# Patient Record
Sex: MALE | Race: WHITE | NOT HISPANIC OR LATINO | Employment: FULL TIME | URBAN - METROPOLITAN AREA
[De-identification: names, ages, dates, MRNs, and addresses within clinical notes are randomized per-mention and may not be internally consistent; named-entity substitution may affect disease eponyms.]

---

## 2017-08-23 ENCOUNTER — HOSPITAL ENCOUNTER (OUTPATIENT)
Dept: PAIN MANAGEMENT | Facility: REHABILITATION | Age: 37
End: 2017-08-23
Attending: PHYSICAL MEDICINE & REHABILITATION
Payer: COMMERCIAL

## 2017-08-23 ENCOUNTER — HOSPITAL ENCOUNTER (OUTPATIENT)
Dept: RADIOLOGY | Facility: REHABILITATION | Age: 37
End: 2017-08-23
Attending: PHYSICAL MEDICINE & REHABILITATION
Payer: COMMERCIAL

## 2017-08-23 VITALS
HEIGHT: 74 IN | RESPIRATION RATE: 16 BRPM | BODY MASS INDEX: 27.87 KG/M2 | DIASTOLIC BLOOD PRESSURE: 82 MMHG | WEIGHT: 217.15 LBS | TEMPERATURE: 97.7 F | OXYGEN SATURATION: 96 % | HEART RATE: 66 BPM | SYSTOLIC BLOOD PRESSURE: 132 MMHG

## 2017-08-23 PROCEDURE — 99152 MOD SED SAME PHYS/QHP 5/>YRS: CPT

## 2017-08-23 PROCEDURE — 700111 HCHG RX REV CODE 636 W/ 250 OVERRIDE (IP)

## 2017-08-23 PROCEDURE — 64635 DESTROY LUMB/SAC FACET JNT: CPT

## 2017-08-23 PROCEDURE — 64636 DESTROY L/S FACET JNT ADDL: CPT

## 2017-08-23 RX ORDER — LIDOCAINE HYDROCHLORIDE 10 MG/ML
INJECTION, SOLUTION EPIDURAL; INFILTRATION; INTRACAUDAL; PERINEURAL
Status: COMPLETED
Start: 2017-08-23 | End: 2017-08-23

## 2017-08-23 RX ORDER — MIDAZOLAM HYDROCHLORIDE 1 MG/ML
INJECTION INTRAMUSCULAR; INTRAVENOUS
Status: COMPLETED
Start: 2017-08-23 | End: 2017-08-23

## 2017-08-23 RX ORDER — BETAMETHASONE SODIUM PHOSPHATE AND BETAMETHASONE ACETATE 3; 3 MG/ML; MG/ML
INJECTION, SUSPENSION INTRA-ARTICULAR; INTRALESIONAL; INTRAMUSCULAR; SOFT TISSUE
Status: COMPLETED
Start: 2017-08-23 | End: 2017-08-23

## 2017-08-23 RX ADMIN — FENTANYL CITRATE 25 MCG: 50 INJECTION, SOLUTION INTRAMUSCULAR; INTRAVENOUS at 13:43

## 2017-08-23 RX ADMIN — FENTANYL CITRATE 50 MCG: 50 INJECTION, SOLUTION INTRAMUSCULAR; INTRAVENOUS at 13:40

## 2017-08-23 RX ADMIN — LIDOCAINE HYDROCHLORIDE 10 ML: 10 INJECTION, SOLUTION EPIDURAL; INFILTRATION; INTRACAUDAL; PERINEURAL at 13:41

## 2017-08-23 RX ADMIN — MIDAZOLAM HYDROCHLORIDE 1 MG: 1 INJECTION, SOLUTION INTRAMUSCULAR; INTRAVENOUS at 13:39

## 2017-08-23 RX ADMIN — BETAMETHASONE SODIUM PHOSPHATE AND BETAMETHASONE ACETATE 12 MG: 3; 3 INJECTION, SUSPENSION INTRA-ARTICULAR; INTRALESIONAL; INTRAMUSCULAR at 13:48

## 2017-08-23 ASSESSMENT — PAIN SCALES - GENERAL
PAINLEVEL_OUTOF10: 1
PAINLEVEL_OUTOF10: 5

## 2017-08-23 NOTE — H&P
[facility] Progress Note    DATE OF SERVICE: 8/23/2017    Referring MD:    Reason for Service: Low back pain    History of Present Illness:  36 yo M with no sig PMHx presents with low back pain.    Pain Onset: 2013, slowly progressive worsening    PREVIOUS PROCEDURES: Dr Diallo Lumbar surgery Oct 2016 with minimal relief. Prolotherapy, RFN, Epidurals without long-term relief.    PREVIOUS THERAPIES: denies    PAIN LOCATION:  Lumbar/Sacral: Midline, Off-midline: RIGHT >> LEFT    PAIN DURATION: Constant  PAIN QUALITY: Sharp Dull Achy  PAIN INTENSITY:  7 out of 10 without medications  PAIN RADIATION: denies  NUMBNESS/TINGLING: denies  WEAKNESS: denies  MUSCLE SPASMS: denies    AGGRAVATING FACTORS: ROM, Weight bearing, All Physical Activities  RELIEVING FACTORS: Medications    Race: Declined to Specify  Ethnicity: Unknown  Preferred Language:English    Current Allergies:  No allergies on file    Current Medications:  doxepin - 10 mg  oxycodone-acetaminophen - 7.5-325 mg  Lyrica - 150 mg TID  Percocet -  mg Every 4-6 hours as needed    Family History  NATURAL PARENTS: Denies history of RA, MS, SLE    Social History: (+)cigarettes 1pk/day. Denies ETOH, illicits.  Smoking Status  EDUCATION GIVEN ABOUT:   Smoking Cessation   Smoking Effects    ROS: Denies visual or hearing changes, swallowing difficulty, SOB, CP, abd pain/constipation/diarrhea, dysuria. All other ROS have been reviewed, and are negative.    Physical Exam:  GENERAL APPEARANCE:  Body habitus: Normal BMI  Cooperation: Full cooperation with no signs of cognitive impairment  Appearance: Well-groomed, Non-disheveled.  SKIN: Normal - No skin rash, subcutaneous nodules, lesions or ulcers observed.  Skin is warm and dry with normal turgor.  H.E.E.N.T.: Normal - Normocephalic. Atraumatic. Pupil diameter equal bilaterally. No obvious auditory deficits. No thyromegaly. No lymphadenopathy.  RESPIRATORY: Normal - Normal symmetric breathing. No  use of accessory muscles. No signs of clubbing in finger nails.  CARDIOVASCULAR: Normal - No edema in Right or Left LE or UE. No trophic changes or stasis dermatitis noted. DP and PT pulses 2+ bilaterally  ABDOMEN: Normal - No obvious masses. No abdominal scars. No hepatosplenomegaly. No obvious hernias noted. No tenderness to palpation in all four quadrants.    Muscoloskeletal:  GAIT: Normal narrow based gait and normal step length, without use of ambulatory device    THORACIC/LUMBAR/SACRAL SPINE/HIPS:  No signs of scoliosis or kyphosis  No evidence of leg-length discrepancy and pelvis is symmetric bilaterally  No evidence of atrophy in BLE throughout  Full AROM with flexion, extension, lateral flexion, rotation bilaterally  + pain with flexion, extension, lateral flexion, rotation bilaterally  Full PROM in bilateral hips with flexion, abduction, extension, adduction  No tenderness in hips bilaterally with passive flexion, abduction, extension, adduction  + tenderness to palpation in midline at L3-S1 levels, off midline LEFT << RIGHT  No tenderness to palpation over SI joint on RIGHT LEFT  No tenderness to palpation over buttock on RIGHT LEFT  No tenderness to palpation in hip or over GTBs on RIGHT LEFT  Straight leg test negative bilaterally  ANAND test negative bilaterally  Motor - HF KF KE PF DF EHL  RIGHT 5/5----------------------->  LEFT   5/5----------------------->  Sensation in BLE intact to light touch  Reflexes - Patella L3/4 Hamstrings L5 Achilles S1 Plantar S2  RIGHT        2+                  2+                   2+           2+  LEFT          2+                   2+                  2+            2+  No ankle clonus bilaterally  Negative Babinski signs bilaterally    NEURO:  MMSE - AAOx3, is cooperative, able to follow commands consistently, able to answer all questions correctly and without hesitation, is able to concentrate fully with each activity asked, no mood or behavioral abnormalities  noted    Cranial Nerves - CNs 3-12 evaluated with no obvious deficits    Tone  No flaccidity or spasticity noted in BUE/BLE  No weakness in truncal stability, and patient able to sit stand without more than 10% of assistance  No contractures or rigidity noted at any joints    Lab Results:  No lab results were found.    Assessment/Plan:  Diagnosis:  Lumbar postlaminectomy syndrome  Lumbosacral spondylosis, Blake L3-S1  Chronic low back pain    Labs and Imagin17 Lspine XRs reviewed in [facility]    17 pending Lspine MRI at [facility]. He will bring CD with him next visit.    Procedures:  17 Order Rt L3-4-5 MB RFN with IV sedation.    17 Rt L3-4-5 MBB #2 with % relief for the duration of the anesthetic. Consider RFN if good results. Consider Lt side after.    17  Rt L3-4-5 MBB #1 with 100% relief for the duration of the anesthetic.  Consider SCS trial with Nuvectra vs Oral Scientific.    Instructions for Patient Checkout: F/U on  for meds, after proc

## 2017-08-23 NOTE — PROGRESS NOTES
Current meds. See medication reconciliation form. Reviewed with pt. Pt denies taking ASA,other blood thinners or anti-inflammatories. Pt has a ride post-procedure (wife, Joie is ). Printed and verbal discharge instructions given to pt who verbalized understanding.

## 2017-08-23 NOTE — PROCEDURES
Date of Service: 8/23/2017    Physician/s: Chris Laboy MD    Pre-operative Diagnosis: Lumbar spondylosis, facet arthropathy    Post-operative Diagnosis: Lumbar spondylosis, facet arthropathy    Procedure: RIGHT L3-4-5 Medial Branch Radiofrequency Neuroablation    Description of procedure:    The risks, benefits, and alternatives of the procedure were reviewed and discussed with the patient.  Written informed consent was freely obtained. A pre-procedural time-out was conducted by the physician verifying patient’s identity, procedure to be performed, procedure site and side, and allergy verification. Appropriate equipment was determined to be in place for the procedure.     An IV was placed peripherally, the patient received a low dose of IV Versed for anxiolysis, and a low dose of IV Fentanyl for pain control. The patient's vital signs were carefully monitored before, throughout, and after the procedure.     In the fluoroscopy suite the patient was placed in a prone position, and a pillow was placed underneath the level of the umbilicus. The skin was prepped and draped in the usual sterile fashion. The fluoroscope was placed over the low back at the appropriate angles, and the targets for needle/probe placement were marked. A 25g needle was placed into each of the markings at three levels, and approx 1cc of 1% Lidocaine was injected subcutaneously into the epidermal and dermal layers. The needle was removed.     A 16 guage, 15 cm RFN cannula with a 10 mm active tip was then placed at the intersection of the transverse process and superior articular process at the L3, L4, and L5 levels on the RIGHT side, where the medial branch runs. The needle/probe tips were then verified by AP, oblique, and lateral views. Once the needle/probe tips were in the optimal positions for radiofrequency neurotomy, contrast dye was used to highlight the medial branch while the fluoroscope was running live. Motor stimulation is  used as an extra precaution to ensure the needle tips are off the lumbar nerve roots prior to each lesion. Following negative aspiration, a mixture ratio 2cc of 1% Lidocaine with 0.66cc of 6mg/ml Celestone was then injected at each location. After a wait period of approximately 2 minutes, a radiofrequency lesion was then created at each level with a temperature of 80 degrees centigrade for 90 seconds. Approximately 30 to 40 seconds into the procedure, the patient was asked to flex her knee and flex/extend the ankle. The needles are not moved, but fluoroscope guidance is used to ensure the needles have not moved, and another radiofrequency lesion was made. Approximately 30 to 40 seconds into the procedure, the patient was asked to flex/extend her knee and flex/extend the ankle. The cannulas were restyletted, and were then removed intact.     The patient's back was covered with a 4x4 gauze, the area was cleansed with sterile normal saline, and a dressing was applied. There were no complications noted, the patient remained hemodynamically stable, and the patient tolerated the procedure well.    Chris Labyo MD  PM&R/Pain Mgmt

## 2017-08-23 NOTE — PROGRESS NOTES
Patient positioned by RN   CST  X- ray Tech. Head supported on pillow . Ankle supported on pillow. Arms supported by stool at head of the bed.Grounding pad to left leg applied by CST and verified by RN.

## 2020-08-06 ENCOUNTER — APPOINTMENT (RX ONLY)
Dept: URBAN - METROPOLITAN AREA CLINIC 31 | Facility: CLINIC | Age: 40
Setting detail: DERMATOLOGY
End: 2020-08-06

## 2020-08-06 DIAGNOSIS — L82.1 OTHER SEBORRHEIC KERATOSIS: ICD-10-CM

## 2020-08-06 DIAGNOSIS — L81.4 OTHER MELANIN HYPERPIGMENTATION: ICD-10-CM

## 2020-08-06 DIAGNOSIS — D18.0 HEMANGIOMA: ICD-10-CM

## 2020-08-06 PROBLEM — D18.01 HEMANGIOMA OF SKIN AND SUBCUTANEOUS TISSUE: Status: ACTIVE | Noted: 2020-08-06

## 2020-08-06 PROCEDURE — ? COUNSELING

## 2020-08-06 PROCEDURE — 99203 OFFICE O/P NEW LOW 30 MIN: CPT

## 2020-08-06 ASSESSMENT — LOCATION DETAILED DESCRIPTION DERM
LOCATION DETAILED: RIGHT SUPERIOR UPPER BACK
LOCATION DETAILED: RIGHT INFERIOR MEDIAL UPPER BACK
LOCATION DETAILED: RIGHT MID-UPPER BACK

## 2020-08-06 ASSESSMENT — LOCATION SIMPLE DESCRIPTION DERM: LOCATION SIMPLE: RIGHT UPPER BACK

## 2020-08-06 ASSESSMENT — LOCATION ZONE DERM: LOCATION ZONE: TRUNK

## 2021-04-07 ENCOUNTER — HOSPITAL ENCOUNTER (OUTPATIENT)
Dept: RADIOLOGY | Facility: MEDICAL CENTER | Age: 41
End: 2021-04-07
Payer: COMMERCIAL

## 2021-04-12 ENCOUNTER — HOSPITAL ENCOUNTER (OUTPATIENT)
Dept: RADIOLOGY | Facility: MEDICAL CENTER | Age: 41
End: 2021-04-12
Payer: COMMERCIAL